# Patient Record
(demographics unavailable — no encounter records)

---
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
## 2017-09-12 NOTE — XRAY REPORT
FINAL REPORT



EXAM:  XR HAND 3+V RT



HISTORY:  RIGHT HAND pain and swelling sp punching a wall, a week

ago 



TECHNIQUE:  3 views right hand 



PRIORS:  None.



FINDINGS:  

No fracture is identified. No dislocation seen. Joint spaces are

within normal limits. No erosive bony change identified.  Carpal

bones maintain normal alignment. Distal radius and ulna are

intact. No radiopaque foreign bodies seen. There is soft tissue

swelling medial aspect of the hand. 



IMPRESSION:  

Soft tissue swelling 



No fracture or dislocation identified

## 2018-06-26 NOTE — EMERGENCY DEPARTMENT REPORT
ED ENT HPI





- General


Chief complaint: Dental/Oral


Stated complaint: TOOTHACHE


Time Seen by Provider: 06/26/18 15:35


Source: patient


Mode of arrival: Ambulatory


Limitations: No Limitations





- History of Present Illness


Initial comments: 





This is a 46-year-old male that presents with bilateral upper tooth pain for 

one week.  Patient reports pain is 10 out of 10 on pain scale.  He is able to 

swallow and eat but pain is worse with chewing.  He has not tried taking 

anything for symptoms.  He is currently having a history with pain.


MD complaint: tooth pain


-: week(s) (1 week)


Location: tooth # (#15 and #1)


Severity: severe


Severity scale (0 -10): 10


Quality: other (throbbing)


Consistency: constant


Improves with: none


Worsens with: eating


Context- Dental: history of dental caries


Associated Symptoms: gum swelling, toothache





- Related Data


 Previous Rx's











 Medication  Instructions  Recorded  Last Taken  Type


 


Acetaminophen/Codeine [Tylenol 1 tab PO Q6H PRN #12 tab 09/12/17 Unknown Rx





/Codeine # 3 tab]    


 


Hydrocortisone 1% [Hydrocortisone 1 applicatio TP TID PRN #1 tube 09/12/17 

Unknown Rx





1% CREAM]    


 


Naproxen [Naprosyn TAB] 500 mg PO BID PRN #20 tablet 09/12/17 Unknown Rx


 


Skin Emollient [Aquaphor (Nf)] 1 applic TP BID #1 jar 09/12/17 Unknown Rx


 


Clindamycin [Clindamycin CAP] 300 mg PO Q8H 7 Days #21 cap 06/26/18 Unknown Rx


 


Naproxen [Naprosyn TAB] 500 mg PO BID PRN #15 tablet 06/26/18 Unknown Rx


 


traMADol [Ultram 50 MG tab] 50 mg PO Q6HR PRN #15 tablet 06/26/18 Unknown Rx











 Allergies











Allergy/AdvReac Type Severity Reaction Status Date / Time


 


No Known Allergies Allergy   Unverified 09/12/17 16:33














ED Dental HPI





- General


Chief complaint: Dental/Oral


Stated complaint: TOOTHACHE


Time Seen by Provider: 06/26/18 15:35


Source: patient


Mode of arrival: Ambulatory


Limitations: No Limitations





- Related Data


 Previous Rx's











 Medication  Instructions  Recorded  Last Taken  Type


 


Acetaminophen/Codeine [Tylenol 1 tab PO Q6H PRN #12 tab 09/12/17 Unknown Rx





/Codeine # 3 tab]    


 


Hydrocortisone 1% [Hydrocortisone 1 applicatio TP TID PRN #1 tube 09/12/17 

Unknown Rx





1% CREAM]    


 


Naproxen [Naprosyn TAB] 500 mg PO BID PRN #20 tablet 09/12/17 Unknown Rx


 


Skin Emollient [Aquaphor (Nf)] 1 applic TP BID #1 jar 09/12/17 Unknown Rx


 


Clindamycin [Clindamycin CAP] 300 mg PO Q8H 7 Days #21 cap 06/26/18 Unknown Rx


 


Naproxen [Naprosyn TAB] 500 mg PO BID PRN #15 tablet 06/26/18 Unknown Rx


 


traMADol [Ultram 50 MG tab] 50 mg PO Q6HR PRN #15 tablet 06/26/18 Unknown Rx











 Allergies











Allergy/AdvReac Type Severity Reaction Status Date / Time


 


No Known Allergies Allergy   Unverified 09/12/17 16:33














ED Review of Systems


ROS: 


Stated complaint: TOOTHACHE


Other details as noted in HPI





Constitutional: denies: chills, fever


ENT: dental pain.  denies: ear pain, throat pain, hearing loss, epistaxis, 

congestion


Respiratory: denies: cough, shortness of breath, wheezing


Cardiovascular: denies: chest pain, palpitations


Gastrointestinal: denies: abdominal pain, nausea, vomiting, diarrhea


Neurological: denies: headache, weakness, paresthesias


Psychiatric: denies: anxiety, depression





ED Past Medical Hx





- Past Medical History


Previous Medical History?: Yes


Hx Asthma: Yes


Hx COPD: Yes





- Surgical History


Past Surgical History?: Yes


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 

surgery





- Social History


Smoking Status: Current Every Day Smoker


Substance Use Type: None





- Medications


Home Medications: 


 Home Medications











 Medication  Instructions  Recorded  Confirmed  Last Taken  Type


 


Acetaminophen/Codeine [Tylenol 1 tab PO Q6H PRN #12 tab 09/12/17  Unknown Rx





/Codeine # 3 tab]     


 


Hydrocortisone 1% [Hydrocortisone 1 applicatio TP TID PRN #1 tube 09/12/17  

Unknown Rx





1% CREAM]     


 


Naproxen [Naprosyn TAB] 500 mg PO BID PRN #20 tablet 09/12/17  Unknown Rx


 


Skin Emollient [Aquaphor (Nf)] 1 applic TP BID #1 jar 09/12/17  Unknown Rx


 


Clindamycin [Clindamycin CAP] 300 mg PO Q8H 7 Days #21 cap 06/26/18  Unknown Rx


 


Naproxen [Naprosyn TAB] 500 mg PO BID PRN #15 tablet 06/26/18  Unknown Rx


 


traMADol [Ultram 50 MG tab] 50 mg PO Q6HR PRN #15 tablet 06/26/18  Unknown Rx














ED Physical Exam





- General


Limitations: No Limitations


General appearance: alert, in no apparent distress





- ENT


ENT exam: Present: normal orophraynx, mucous membranes moist, other (dental 

caries #15, & 1, surrounding mucosal swelling, tenderness)





- Neck


Neck exam: Present: normal inspection, full ROM.  Absent: tenderness, 

lymphadenopathy





- Respiratory


Respiratory exam: Present: normal lung sounds bilaterally.  Absent: respiratory 

distress





- Cardiovascular


Cardiovascular Exam: Present: regular rate, normal rhythm.  Absent: systolic 

murmur, diastolic murmur, rubs, gallop





- GI/Abdominal


GI/Abdominal exam: Present: soft, normal bowel sounds





- Neurological Exam


Neurological exam: Present: alert, oriented X3





- Psychiatric


Psychiatric exam: Present: normal affect, normal mood





- Skin


Skin exam: Present: warm, dry, intact, normal color.  Absent: rash





ED Course


 Vital Signs











  06/26/18





  11:10


 


Temperature 98.5 F


 


Pulse Rate 62


 


Respiratory 22





Rate 


 


Blood Pressure 108/48


 


O2 Sat by Pulse 98





Oximetry 














ED Medical Decision Making





- Medical Decision Making





This is a 46-year-old male that presents with tooth ache for one week.  Patient 

is stable and was examined by me.  Vital signs normal.  Given norco 7.5 mg by 

mouth once in ER. Susceptible of dental caries.  Start clindamycin, naproxen, 

and tramadol.  Discussed plan with patient.  He agreed with ER plan. Discharged 

home. Follow up with dentist in 2-3 days. 


Critical care attestation.: 


If time is entered above; I have spent that time in minutes in the direct care 

of this critically ill patient, excluding procedure time.








ED Disposition


Clinical Impression: 


 Toothache, Dental caries





Disposition: DC-01 TO HOME OR SELFCARE


Is pt being admited?: No


Does the pt Need Aspirin: No


Condition: Stable


Instructions:  Dental Caries (ED), Toothache (ED)


Additional Instructions: 


Complete all days of clindamycin as prescribed for 14 days.


Avoid drinking alcohol while taking antibiotics and for about 24 hours after 

completion of antibiotics.


Follow up with Dentist in 24-72 hours.


Prescriptions: 


Clindamycin [Clindamycin CAP] 300 mg PO Q8H 7 Days #21 cap


Naproxen [Naprosyn TAB] 500 mg PO BID PRN #15 tablet


 PRN Reason: Pain, Moderate (4-6)


traMADol [Ultram 50 MG tab] 50 mg PO Q6HR PRN #15 tablet


 PRN Reason: Pain , Severe (7-10)


Referrals: 


[facility] [Outside] - 3-5 Days


[facility] [Outside] - 3-5 Days


[facility] [Outside] - 3-5 Days


[facility] [Outside] - 3-5 Days


Time of Disposition: 15:44


Print Language: ENGLISH